# Patient Record
Sex: MALE | Race: WHITE | NOT HISPANIC OR LATINO | URBAN - METROPOLITAN AREA
[De-identification: names, ages, dates, MRNs, and addresses within clinical notes are randomized per-mention and may not be internally consistent; named-entity substitution may affect disease eponyms.]

---

## 2022-12-27 ENCOUNTER — OFFICE VISIT (OUTPATIENT)
Dept: INTERNAL MEDICINE | Facility: CLINIC | Age: 72
End: 2022-12-27
Payer: COMMERCIAL

## 2022-12-27 VITALS
TEMPERATURE: 97.1 F | HEIGHT: 70 IN | WEIGHT: 232 LBS | HEART RATE: 96 BPM | BODY MASS INDEX: 33.21 KG/M2 | OXYGEN SATURATION: 99 % | DIASTOLIC BLOOD PRESSURE: 84 MMHG | SYSTOLIC BLOOD PRESSURE: 142 MMHG

## 2022-12-27 DIAGNOSIS — E11.9 TYPE 2 DIABETES MELLITUS WITHOUT COMPLICATION, WITHOUT LONG-TERM CURRENT USE OF INSULIN (CMS/HCC): ICD-10-CM

## 2022-12-27 DIAGNOSIS — I10 PRIMARY HYPERTENSION: Primary | ICD-10-CM

## 2022-12-27 DIAGNOSIS — Z11.59 ENCOUNTER FOR HEPATITIS C SCREENING TEST FOR LOW RISK PATIENT: ICD-10-CM

## 2022-12-27 DIAGNOSIS — E78.9 LIPID DISORDER: ICD-10-CM

## 2022-12-27 PROCEDURE — 99204 OFFICE O/P NEW MOD 45 MIN: CPT | Performed by: STUDENT IN AN ORGANIZED HEALTH CARE EDUCATION/TRAINING PROGRAM

## 2022-12-27 PROCEDURE — 3008F BODY MASS INDEX DOCD: CPT | Performed by: STUDENT IN AN ORGANIZED HEALTH CARE EDUCATION/TRAINING PROGRAM

## 2022-12-27 RX ORDER — EZETIMIBE 10 MG/1
10 TABLET ORAL DAILY
COMMUNITY
Start: 2022-10-13 | End: 2023-07-05 | Stop reason: SDUPTHER

## 2022-12-27 RX ORDER — LOSARTAN POTASSIUM 50 MG/1
50 TABLET ORAL DAILY
COMMUNITY
Start: 2022-10-06 | End: 2023-02-23

## 2022-12-27 RX ORDER — GLIPIZIDE 10 MG/1
10 TABLET ORAL DAILY
COMMUNITY
Start: 2022-12-03 | End: 2023-07-05 | Stop reason: SDUPTHER

## 2022-12-27 RX ORDER — METFORMIN HYDROCHLORIDE 500 MG/1
1000 TABLET, EXTENDED RELEASE ORAL DAILY
COMMUNITY
Start: 2022-10-05 | End: 2023-04-03 | Stop reason: SDUPTHER

## 2022-12-27 ASSESSMENT — PATIENT HEALTH QUESTIONNAIRE - PHQ9: SUM OF ALL RESPONSES TO PHQ9 QUESTIONS 1 & 2: 0

## 2022-12-27 NOTE — PROGRESS NOTES
NEW PATIENT    [facility] [place]  Arlet Kumar MD    Phone: [phone]  Fax: [phone]      HISTORY OF PRESENT ILLNESS        Establish Care       Patient is an 72 y.o. male who presents on 1/5/2023 as new patient to establish care.    History of Present Illness    Recent health issues:    DM  - metformin - takes every other day because of side effects  - taking januvia, glipizide   - a little movement day to day      Other Providers caring for patient:  Patient Care Team     Relationship Specialty Notifications Start End   Arlet Kumar MD PCP - General Family Medicine Admissions 12/27/22     Address:  [address] [place] [id number]          Health Maintenance   Topic Date Due   • Diabetes Kidney Health Evaluation: eGFR  Never done   • Diabetes Kidney Health Evaluation:  uACR  Never done   • Colorectal Cancer Screening  Never done   • COVID-19 Vaccine (1) Never done   • Pneumococcal (65 years and older) (1 - PCV) Never done   • Annual Dilated Retinal Exam  Never done   • Diabetic Foot Exam  Never done   • Hemoglobin A1C  Never done   • Medicare Annual Wellness Visit  Never done   • Hepatitis C Screening  Never done   • DTaP, Tdap, and Td Vaccines (1 - Tdap) Never done   • Zoster Vaccine (1 of 2) Never done   • Falls Risk Screening  Never done   • Influenza Vaccine (1) Never done   • Depression Screening  12/27/2023   • Meningococcal ACWY  Aged Out   • HIB Vaccines  Aged Out   • Hepatitis B Vaccines  Aged Out   • IPV Vaccines  Aged Out   • HPV Vaccines  Aged Out       Below was reviewed by me on the day of the visit.  PAST MEDICAL AND SURGICAL HISTORY        Past Medical History:   Diagnosis Date   • Hypertension    • Lipid disorder    • Type 2 diabetes mellitus (CMS/HCC)        Past Surgical History:   Procedure Laterality Date   • HERNIA REPAIR       MEDICATIONS          Current Outpatient Medications:   •  ezetimibe (ZETIA) 10 mg tablet, Take  "10 mg by mouth daily., Disp: , Rfl:   •  glipiZIDE (GLUCOTROL) 10 mg tablet, Take 10 mg by mouth daily., Disp: , Rfl:   •  losartan (COZAAR) 50 mg tablet, Take 50 mg by mouth daily., Disp: , Rfl:   •  metFORMIN XR (GLUCOPHAGE-XR) 500 mg 24 hr tablet, Take 1,000 mg by mouth daily., Disp: , Rfl:   •  SITagliptin (JANUVIA) 100 mg tablet, Take 100 mg by mouth daily., Disp: , Rfl:   ALLERGIES        Patient has no known allergies.  FAMILY HISTORY        Family History   Problem Relation Age of Onset   • Heart attack Biological Father      SOCIAL/ TOBACCO HISTORY        Social History     Tobacco Use   • Smoking status: Never   • Smokeless tobacco: Never   Substance Use Topics   • Alcohol use: Never     REVIEW OF SYSTEMS        Review of Systems   See HPI  PHYSICAL EXAMINATION      Visit Vitals  BP (!) 142/84 (BP Location: Right upper arm, Patient Position: Sitting)   Pulse 96   Temp 36.2 °C (97.1 °F)   Ht 1.778 m (5' 10\")   Wt 105 kg (232 lb)   SpO2 99%   BMI 33.29 kg/m²      Body mass index is 33.29 kg/m².  Wt Readings from Last 3 Encounters:   12/27/22 105 kg (232 lb)        No results found.      Physical Exam  Vitals reviewed.   Constitutional:       General: He is not in acute distress.  HENT:      Head: Normocephalic and atraumatic.   Eyes:      Conjunctiva/sclera: Conjunctivae normal.   Cardiovascular:      Rate and Rhythm: Normal rate and regular rhythm.      Heart sounds: Normal heart sounds.   Pulmonary:      Effort: Pulmonary effort is normal.      Breath sounds: Normal breath sounds.   Musculoskeletal:      Cervical back: Neck supple.   Skin:     General: Skin is warm and dry.   Neurological:      Mental Status: He is alert.   Psychiatric:         Mood and Affect: Mood normal.         Behavior: Behavior normal.         PRIOR LABS        No results found for: HGBA1C  No results found for: CHOL  No results found for: HDL  No results found for: LDLCALC  No results found for: TRIG  No results found for: " CHOLHDL  No results found for: TSH  No results found for: WBC, HGB, HCT, MCV, PLT  No results found for: NA, K, CL, CO2, BUN, CREATININE, GLUCOSE, AST, ALT, PROTEIN, ALBUMIN, BILITOT, EGFR, ANIONGAP    ASSESSMENT AND PLAN   Diagnoses and all orders for this visit:    Primary hypertension (Primary)  Assessment & Plan:  BP slightly above goal today. Recommend efforts at lowering through diet and physical activity. If remains elevated at follow up visit, would recommend adding/adjusting medications.    Reviewed to take BP medication as prescribed  Eat a healthy diet- avoid foods high in salt   Exercise and try to maintain a healthy weight  Avoid tobacco products  Avoid over-the -counter decongestant medications unless recommended by your provider  Monitor your BP at home if possible  Keep BP log and bring to your next appointment  Please call the office or go to the ER for new symptoms such as severe headache, dizziness, confusion, weakness in an arm or leg, trouble speaking, abdominal pain, chest pain, difficulty breathing, or other new or worsening symptoms.      Orders:  -     Basic metabolic panel; Future    Type 2 diabetes mellitus without complication, without long-term current use of insulin (CMS/[facility])  Assessment & Plan:  Current medications: metformin QOD (max tolerated), glipizide, sitagliptin  Labs due  Recommend healthful diet and regular physical activity    Orders:  -     Hemoglobin A1c; Future  -     Basic metabolic panel; Future  -     Microalbumin / creatinine, urine ratio; Future    Lipid disorder  -     Lipid panel; Future    Encounter for hepatitis C screening test for low risk patient  -     Hepatitis C antibody; Future    Return in about 1 month (around 1/27/2023).  To request prior PCP records    Arlet Kumar MD    1/5/2023

## 2023-01-05 NOTE — ASSESSMENT & PLAN NOTE
Current medications: metformin QOD (max tolerated), glipizide, sitagliptin  Labs due  Recommend healthful diet and regular physical activity

## 2023-01-05 NOTE — ASSESSMENT & PLAN NOTE
BP slightly above goal today. Recommend efforts at lowering through diet and physical activity. If remains elevated at follow up visit, would recommend adding/adjusting medications.    Reviewed to take BP medication as prescribed  Eat a healthy diet- avoid foods high in salt   Exercise and try to maintain a healthy weight  Avoid tobacco products  Avoid over-the -counter decongestant medications unless recommended by your provider  Monitor your BP at home if possible  Keep BP log and bring to your next appointment  Please call the office or go to the ER for new symptoms such as severe headache, dizziness, confusion, weakness in an arm or leg, trouble speaking, abdominal pain, chest pain, difficulty breathing, or other new or worsening symptoms.

## 2023-01-13 ENCOUNTER — APPOINTMENT (OUTPATIENT)
Dept: LAB | Age: 73
End: 2023-01-13
Attending: STUDENT IN AN ORGANIZED HEALTH CARE EDUCATION/TRAINING PROGRAM
Payer: COMMERCIAL

## 2023-01-13 DIAGNOSIS — Z11.59 ENCOUNTER FOR HEPATITIS C SCREENING TEST FOR LOW RISK PATIENT: ICD-10-CM

## 2023-01-13 DIAGNOSIS — E11.9 TYPE 2 DIABETES MELLITUS WITHOUT COMPLICATION, WITHOUT LONG-TERM CURRENT USE OF INSULIN (CMS/HCC): ICD-10-CM

## 2023-01-13 DIAGNOSIS — I10 PRIMARY HYPERTENSION: ICD-10-CM

## 2023-01-13 DIAGNOSIS — E78.9 LIPID DISORDER: ICD-10-CM

## 2023-01-13 LAB
ALBUMIN/CREAT UR: 8.1 UG/MG
ANION GAP SERPL CALC-SCNC: 9 MEQ/L (ref 3–15)
BUN SERPL-MCNC: 13 MG/DL (ref 8–20)
CALCIUM SERPL-MCNC: 10.1 MG/DL (ref 8.9–10.3)
CHLORIDE SERPL-SCNC: 102 MEQ/L (ref 98–109)
CHOLEST SERPL-MCNC: 169 MG/DL
CO2 SERPL-SCNC: 27 MEQ/L (ref 22–32)
CREAT SERPL-MCNC: 1.2 MG/DL (ref 0.8–1.3)
CREAT UR-MCNC: 184.1 MG/DL
EST. AVERAGE GLUCOSE BLD GHB EST-MCNC: 186 MG/DL
GFR SERPL CREATININE-BSD FRML MDRD: 59.9 ML/MIN/1.73M*2
GLUCOSE SERPL-MCNC: 139 MG/DL (ref 70–99)
HBA1C MFR BLD HPLC: 8.1 %
HDLC SERPL-MCNC: 35 MG/DL
HDLC SERPL: 4.8 {RATIO}
LDLC SERPL CALC-MCNC: 119 MG/DL
MICROALBUMIN UR-MCNC: 15 MG/L
NONHDLC SERPL-MCNC: 134 MG/DL
POTASSIUM SERPL-SCNC: 4.8 MEQ/L (ref 3.6–5.1)
SODIUM SERPL-SCNC: 138 MEQ/L (ref 136–144)
TRIGL SERPL-MCNC: 76 MG/DL (ref 30–149)

## 2023-01-13 PROCEDURE — 80061 LIPID PANEL: CPT

## 2023-01-13 PROCEDURE — 82570 ASSAY OF URINE CREATININE: CPT

## 2023-01-13 PROCEDURE — 86803 HEPATITIS C AB TEST: CPT

## 2023-01-13 PROCEDURE — 80048 BASIC METABOLIC PNL TOTAL CA: CPT

## 2023-01-13 PROCEDURE — 36415 COLL VENOUS BLD VENIPUNCTURE: CPT

## 2023-01-13 PROCEDURE — 83036 HEMOGLOBIN GLYCOSYLATED A1C: CPT

## 2023-01-13 PROCEDURE — 82043 UR ALBUMIN QUANTITATIVE: CPT

## 2023-01-14 LAB — HCV AB SER QL: NONREACTIVE

## 2023-01-31 ENCOUNTER — OFFICE VISIT (OUTPATIENT)
Dept: INTERNAL MEDICINE | Facility: CLINIC | Age: 73
End: 2023-01-31
Payer: COMMERCIAL

## 2023-01-31 VITALS
OXYGEN SATURATION: 97 % | HEART RATE: 86 BPM | HEIGHT: 69 IN | BODY MASS INDEX: 34.21 KG/M2 | DIASTOLIC BLOOD PRESSURE: 86 MMHG | TEMPERATURE: 97.5 F | SYSTOLIC BLOOD PRESSURE: 130 MMHG | WEIGHT: 231 LBS

## 2023-01-31 DIAGNOSIS — I10 PRIMARY HYPERTENSION: Primary | ICD-10-CM

## 2023-01-31 DIAGNOSIS — E78.9 LIPID DISORDER: ICD-10-CM

## 2023-01-31 DIAGNOSIS — E11.9 TYPE 2 DIABETES MELLITUS WITHOUT COMPLICATION, WITHOUT LONG-TERM CURRENT USE OF INSULIN (CMS/HCC): ICD-10-CM

## 2023-01-31 PROCEDURE — 99214 OFFICE O/P EST MOD 30 MIN: CPT | Performed by: STUDENT IN AN ORGANIZED HEALTH CARE EDUCATION/TRAINING PROGRAM

## 2023-01-31 PROCEDURE — 3008F BODY MASS INDEX DOCD: CPT | Performed by: STUDENT IN AN ORGANIZED HEALTH CARE EDUCATION/TRAINING PROGRAM

## 2023-01-31 RX ORDER — ATORVASTATIN CALCIUM 20 MG/1
20 TABLET, FILM COATED ORAL DAILY
Qty: 90 TABLET | Refills: 0 | Status: SHIPPED | OUTPATIENT
Start: 2023-01-31 | End: 2023-05-01

## 2023-01-31 NOTE — ASSESSMENT & PLAN NOTE
A1c 8.1  Not at goal, reviewed with patient and discussed addition of SGLT2i. He is averse to adding a new medication that will not be well covered by insurance.  Recommend working on diet and increasing movement/physical activity  Continue ARB  Start statin  Repeat A1c in 3 months

## 2023-01-31 NOTE — ASSESSMENT & PLAN NOTE
BP in acceptable range. Continue current medications, healthful low sodium diet, and regular physical activity.

## 2023-01-31 NOTE — PROGRESS NOTES
[facility] in [place]  Arlet Kumar MD      Phone: [phone]  Fax: [phone]           Patient ID: Randell Samuel                              : 1950    Visit Date: 2023    Chief Complaint: Follow-up      HPI      Patient ID: Randell Samuel is a 72 y.o. male who presents for follow up    T2DM  - A1c 8.1  - metformin 500 mg daily (couldn't tolerate side effects with higher dose), glipizide 10 mg daily, sitagliptin 100 mg daily  - last retinal eye exam was 2022    HTN - stable on current medication    CRC- last c-scope was 7-8 years ago and plan had been to repeat in 10 years, doesn't know the name of doc who did c-scope    The following have been reviewed and updated as appropriate in this visit:    Current Outpatient Medications:   •  atorvastatin (LIPITOR) 20 mg tablet, Take 1 tablet (20 mg total) by mouth daily., Disp: 90 tablet, Rfl: 0  •  ezetimibe (ZETIA) 10 mg tablet, Take 10 mg by mouth daily., Disp: , Rfl:   •  glipiZIDE (GLUCOTROL) 10 mg tablet, Take 10 mg by mouth daily., Disp: , Rfl:   •  losartan (COZAAR) 50 mg tablet, Take 50 mg by mouth daily., Disp: , Rfl:   •  metFORMIN XR (GLUCOPHAGE-XR) 500 mg 24 hr tablet, Take 1,000 mg by mouth daily., Disp: , Rfl:   •  SITagliptin (JANUVIA) 100 mg tablet, Take 100 mg by mouth daily., Disp: , Rfl:     No Known Allergies      Health Maintenance   Topic Date Due   • Colorectal Cancer Screening  Never done   • COVID-19 Vaccine (1) Never done   • Annual Dilated Retinal Exam  Never done   • Diabetic Foot Exam  Never done   • Medicare Annual Wellness Visit  Never done   • Zoster Vaccine (1 of 2) Never done   • Falls Risk Screening  Never done   • Pneumococcal (65 years and older) (3 - PPSV23 if available, else PCV20) 2018   • Influenza Vaccine (1) Never done   • Depression Screening  2023   • Diabetes Kidney Health Evaluation: eGFR  2024   • Diabetes Kidney Health Evaluation:  uACR   "01/13/2024   • Hemoglobin A1C  01/13/2024   • DTaP, Tdap, and Td Vaccines (2 - Td or Tdap) 07/21/2032   • Hepatitis C Screening  Completed   • Meningococcal ACWY  Aged Out   • HIB Vaccines  Aged Out   • Hepatitis B Vaccines  Aged Out   • IPV Vaccines  Aged Out   • HPV Vaccines  Aged Out       Other screenings not listed above:    Review of Systems  Rest of ROS as above    Objective:    Vitals:    01/31/23 1045   BP: 130/86   BP Location: Right upper arm   Patient Position: Sitting   Pulse: 86   Temp: 36.4 °C (97.5 °F)   SpO2: 97%   Weight: 105 kg (231 lb)   Height: 1.753 m (5' 9\")       Body mass index is 34.11 kg/m².  Wt Readings from Last 3 Encounters:   01/31/23 105 kg (231 lb)   12/27/22 105 kg (232 lb)          Physical Exam  Vitals reviewed.   Constitutional:       General: He is not in acute distress.     Appearance: He is not ill-appearing.   HENT:      Head: Normocephalic and atraumatic.   Eyes:      Conjunctiva/sclera: Conjunctivae normal.   Cardiovascular:      Rate and Rhythm: Normal rate.   Pulmonary:      Effort: Pulmonary effort is normal.   Musculoskeletal:         General: No deformity.   Neurological:      General: No focal deficit present.      Mental Status: He is alert.   Psychiatric:         Mood and Affect: Mood normal.         Behavior: Behavior normal.           Assessment and plan    Diagnoses and all orders for this visit:    Primary hypertension (Primary)  Assessment & Plan:  BP in acceptable range. Continue current medications, healthful low sodium diet, and regular physical activity.      Type 2 diabetes mellitus without complication, without long-term current use of insulin (CMS/[facility])  Assessment & Plan:  A1c 8.1  Not at goal, reviewed with patient and discussed addition of SGLT2i. He is averse to adding a new medication that will not be well covered by insurance.  Recommend working on diet and increasing movement/physical activity  Continue ARB  Start statin  Repeat A1c in 3 " months    Orders:  -     Hemoglobin A1c; Future    Lipid disorder  Assessment & Plan:  Lab Results   Component Value Date    CHOL 169 01/13/2023     Lab Results   Component Value Date    HDL 35 (L) 01/13/2023     Lab Results   Component Value Date    LDLCALC 119 (H) 01/13/2023     Lab Results   Component Value Date    TRIG 76 01/13/2023     Statin indicated, particularly in light of T2DM  Start atorvastatin  Recheck lipids with next labs    Orders:  -     atorvastatin (LIPITOR) 20 mg tablet; Take 1 tablet (20 mg total) by mouth daily.  -     Lipid panel; Future      Return in about 4 months (around 5/31/2023).    Arlet Kumar MD   1/31/2023

## 2023-01-31 NOTE — PATIENT INSTRUCTIONS
Go to the lab to have your bloodwork done in 3 months    Work on diet and physical activity to improve your blood sugar and cholesterol    Things that you can do to improve cholesterol and lower risk of cardiovascular disease:  - Eat a high fiber, low-salt diet that emphasizes fruits, vegetables, whole grains, beans, nuts and seeds  - Limit the amount of animal fats and other saturated fats in your diet (fried foods, fast food, take-out, ultra-processed snacks and sweets, red meat, full fat dairy)  - Use good fats in moderation (fatty fish, olive oil, avocado, nuts and seeds)  - Exercise on most days of the week for at least 30 minutes  - Avoid tobacco use and drink alcohol in moderation, if at all  - Develop healthy coping skills to manage stress

## 2023-01-31 NOTE — ASSESSMENT & PLAN NOTE
Lab Results   Component Value Date    CHOL 169 01/13/2023     Lab Results   Component Value Date    HDL 35 (L) 01/13/2023     Lab Results   Component Value Date    LDLCALC 119 (H) 01/13/2023     Lab Results   Component Value Date    TRIG 76 01/13/2023     Statin indicated, particularly in light of T2DM  Start atorvastatin  Recheck lipids with next labs

## 2023-02-17 ENCOUNTER — TELEPHONE (OUTPATIENT)
Dept: INTERNAL MEDICINE | Facility: CLINIC | Age: 73
End: 2023-02-17
Payer: COMMERCIAL

## 2023-02-22 NOTE — TELEPHONE ENCOUNTER
Medicine Refill Request    Last Office: 1/31/2023   Last Consult Visit: Visit date not found  Last Telemedicine Visit: Visit date not found    Next Appointment: 6/1/2023      Current Outpatient Medications:   •  atorvastatin (LIPITOR) 20 mg tablet, Take 1 tablet (20 mg total) by mouth daily., Disp: 90 tablet, Rfl: 0  •  ezetimibe (ZETIA) 10 mg tablet, Take 10 mg by mouth daily., Disp: , Rfl:   •  glipiZIDE (GLUCOTROL) 10 mg tablet, Take 10 mg by mouth daily., Disp: , Rfl:   •  losartan (COZAAR) 50 mg tablet, Take 50 mg by mouth daily., Disp: , Rfl:   •  metFORMIN XR (GLUCOPHAGE-XR) 500 mg 24 hr tablet, Take 1,000 mg by mouth daily., Disp: , Rfl:   •  SITagliptin (JANUVIA) 100 mg tablet, Take 100 mg by mouth daily., Disp: , Rfl:       BP Readings from Last 3 Encounters:   01/31/23 130/86   12/27/22 (!) 142/84       Recent Lab results:  Lab Results   Component Value Date    CHOL 169 01/13/2023   ,   Lab Results   Component Value Date    HDL 35 (L) 01/13/2023   ,   Lab Results   Component Value Date    LDLCALC 119 (H) 01/13/2023   ,   Lab Results   Component Value Date    TRIG 76 01/13/2023        Lab Results   Component Value Date    GLUCOSE 139 (H) 01/13/2023   ,   Lab Results   Component Value Date    HGBA1C 8.1 (H) 01/13/2023         Lab Results   Component Value Date    CREATININE 1.2 01/13/2023       No results found for: TSH

## 2023-02-23 RX ORDER — LOSARTAN POTASSIUM 50 MG/1
TABLET ORAL
Qty: 90 TABLET | Refills: 2 | Status: SHIPPED | OUTPATIENT
Start: 2023-02-23 | End: 2023-06-01

## 2023-02-23 NOTE — TELEPHONE ENCOUNTER
Left message on voicemail that form was completed and mailed and losartin refill 90 day was sent to pharm

## 2023-02-23 NOTE — TELEPHONE ENCOUNTER
Pt called to inquire if form had been completed and mailed back so he can get his medication.    Please advise

## 2023-03-09 ENCOUNTER — APPOINTMENT (OUTPATIENT)
Dept: LAB | Age: 73
End: 2023-03-09
Attending: STUDENT IN AN ORGANIZED HEALTH CARE EDUCATION/TRAINING PROGRAM
Payer: COMMERCIAL

## 2023-03-09 DIAGNOSIS — E78.9 LIPID DISORDER: ICD-10-CM

## 2023-03-09 DIAGNOSIS — E11.9 TYPE 2 DIABETES MELLITUS WITHOUT COMPLICATION, WITHOUT LONG-TERM CURRENT USE OF INSULIN (CMS/HCC): ICD-10-CM

## 2023-03-09 LAB
CHOLEST SERPL-MCNC: 94 MG/DL
EST. AVERAGE GLUCOSE BLD GHB EST-MCNC: 180 MG/DL
HBA1C MFR BLD HPLC: 7.9 %
HDLC SERPL-MCNC: 34 MG/DL
HDLC SERPL: 2.8 {RATIO}
LDLC SERPL CALC-MCNC: 47 MG/DL
NONHDLC SERPL-MCNC: 60 MG/DL
TRIGL SERPL-MCNC: 67 MG/DL (ref 30–149)

## 2023-03-09 PROCEDURE — 80061 LIPID PANEL: CPT

## 2023-03-09 PROCEDURE — 83036 HEMOGLOBIN GLYCOSYLATED A1C: CPT

## 2023-03-09 PROCEDURE — 36415 COLL VENOUS BLD VENIPUNCTURE: CPT

## 2023-04-03 RX ORDER — METFORMIN HYDROCHLORIDE 500 MG/1
1000 TABLET, EXTENDED RELEASE ORAL DAILY
Qty: 180 TABLET | Refills: 1 | Status: SHIPPED | OUTPATIENT
Start: 2023-04-03

## 2023-04-03 NOTE — TELEPHONE ENCOUNTER
Medicine Refill Request    Last Office: 1/31/2023   Last Consult Visit: Visit date not found  Last Telemedicine Visit: Visit date not found    Next Appointment: 6/1/2023      Current Outpatient Medications:   •  atorvastatin (LIPITOR) 20 mg tablet, Take 1 tablet (20 mg total) by mouth daily., Disp: 90 tablet, Rfl: 0  •  ezetimibe (ZETIA) 10 mg tablet, Take 10 mg by mouth daily., Disp: , Rfl:   •  glipiZIDE (GLUCOTROL) 10 mg tablet, Take 10 mg by mouth daily., Disp: , Rfl:   •  losartan (COZAAR) 50 mg tablet, TAKE 1 TABLET BY MOUTH EVERY DAY, Disp: 90 tablet, Rfl: 2  •  metFORMIN XR (GLUCOPHAGE-XR) 500 mg 24 hr tablet, Take 1,000 mg by mouth daily., Disp: , Rfl:   •  SITagliptin (JANUVIA) 100 mg tablet, Take 100 mg by mouth daily., Disp: , Rfl:       BP Readings from Last 3 Encounters:   01/31/23 130/86   12/27/22 (!) 142/84       Recent Lab results:  Lab Results   Component Value Date    CHOL 94 03/09/2023   ,   Lab Results   Component Value Date    HDL 34 (L) 03/09/2023   ,   Lab Results   Component Value Date    LDLCALC 47 03/09/2023   ,   Lab Results   Component Value Date    TRIG 67 03/09/2023        Lab Results   Component Value Date    GLUCOSE 139 (H) 01/13/2023   ,   Lab Results   Component Value Date    HGBA1C 7.9 (H) 03/09/2023         Lab Results   Component Value Date    CREATININE 1.2 01/13/2023       No results found for: TSH

## 2023-04-29 DIAGNOSIS — E78.9 LIPID DISORDER: ICD-10-CM

## 2023-05-01 RX ORDER — ATORVASTATIN CALCIUM 20 MG/1
TABLET, FILM COATED ORAL
Qty: 90 TABLET | Refills: 0 | Status: SHIPPED | OUTPATIENT
Start: 2023-05-01 | End: 2023-07-05 | Stop reason: SDUPTHER

## 2023-05-01 NOTE — TELEPHONE ENCOUNTER
Medicine Refill Request    Last Office: 1/31/2023   Last Consult Visit: Visit date not found  Last Telemedicine Visit: Visit date not found    Next Appointment: 6/1/2023      Current Outpatient Medications:   •  atorvastatin (LIPITOR) 20 mg tablet, Take 1 tablet (20 mg total) by mouth daily., Disp: 90 tablet, Rfl: 0  •  ezetimibe (ZETIA) 10 mg tablet, Take 10 mg by mouth daily., Disp: , Rfl:   •  glipiZIDE (GLUCOTROL) 10 mg tablet, Take 10 mg by mouth daily., Disp: , Rfl:   •  losartan (COZAAR) 50 mg tablet, TAKE 1 TABLET BY MOUTH EVERY DAY, Disp: 90 tablet, Rfl: 2  •  metFORMIN XR (GLUCOPHAGE-XR) 500 mg 24 hr tablet, Take 2 tablets (1,000 mg total) by mouth daily., Disp: 180 tablet, Rfl: 1  •  SITagliptin (JANUVIA) 100 mg tablet, Take 100 mg by mouth daily., Disp: , Rfl:       BP Readings from Last 3 Encounters:   01/31/23 130/86   12/27/22 (!) 142/84       Recent Lab results:  Lab Results   Component Value Date    CHOL 94 03/09/2023   ,   Lab Results   Component Value Date    HDL 34 (L) 03/09/2023   ,   Lab Results   Component Value Date    LDLCALC 47 03/09/2023   ,   Lab Results   Component Value Date    TRIG 67 03/09/2023        Lab Results   Component Value Date    GLUCOSE 139 (H) 01/13/2023   ,   Lab Results   Component Value Date    HGBA1C 7.9 (H) 03/09/2023         Lab Results   Component Value Date    CREATININE 1.2 01/13/2023       No results found for: TSH

## 2023-06-01 ENCOUNTER — OFFICE VISIT (OUTPATIENT)
Dept: INTERNAL MEDICINE | Facility: CLINIC | Age: 73
End: 2023-06-01
Payer: COMMERCIAL

## 2023-06-01 VITALS
TEMPERATURE: 97.9 F | RESPIRATION RATE: 20 BRPM | SYSTOLIC BLOOD PRESSURE: 160 MMHG | DIASTOLIC BLOOD PRESSURE: 80 MMHG | HEIGHT: 69 IN | BODY MASS INDEX: 34.21 KG/M2 | OXYGEN SATURATION: 95 % | HEART RATE: 90 BPM | WEIGHT: 231 LBS

## 2023-06-01 DIAGNOSIS — J45.20 MILD INTERMITTENT ASTHMA WITHOUT COMPLICATION: ICD-10-CM

## 2023-06-01 DIAGNOSIS — I10 PRIMARY HYPERTENSION: ICD-10-CM

## 2023-06-01 DIAGNOSIS — E78.9 LIPID DISORDER: ICD-10-CM

## 2023-06-01 DIAGNOSIS — E11.9 TYPE 2 DIABETES MELLITUS WITHOUT COMPLICATION, WITHOUT LONG-TERM CURRENT USE OF INSULIN (CMS/HCC): Primary | ICD-10-CM

## 2023-06-01 PROCEDURE — 99214 OFFICE O/P EST MOD 30 MIN: CPT | Performed by: STUDENT IN AN ORGANIZED HEALTH CARE EDUCATION/TRAINING PROGRAM

## 2023-06-01 PROCEDURE — 3079F DIAST BP 80-89 MM HG: CPT | Performed by: STUDENT IN AN ORGANIZED HEALTH CARE EDUCATION/TRAINING PROGRAM

## 2023-06-01 PROCEDURE — 3077F SYST BP >= 140 MM HG: CPT | Performed by: STUDENT IN AN ORGANIZED HEALTH CARE EDUCATION/TRAINING PROGRAM

## 2023-06-01 PROCEDURE — 3008F BODY MASS INDEX DOCD: CPT | Performed by: STUDENT IN AN ORGANIZED HEALTH CARE EDUCATION/TRAINING PROGRAM

## 2023-06-01 RX ORDER — LOSARTAN POTASSIUM 100 MG/1
100 TABLET ORAL DAILY
Qty: 90 TABLET | Refills: 1 | Status: SHIPPED | OUTPATIENT
Start: 2023-06-01 | End: 2023-11-28

## 2023-06-01 RX ORDER — ALBUTEROL SULFATE 90 UG/1
2 INHALANT RESPIRATORY (INHALATION) EVERY 6 HOURS PRN
Qty: 18 G | Refills: 1 | Status: SHIPPED | OUTPATIENT
Start: 2023-06-01 | End: 2023-07-01

## 2023-06-01 NOTE — PROGRESS NOTES
[facility] [place]  Arlet Kumar MD      Phone: [phone]  Fax: [phone]           Patient ID: Randell Samuel                              : 1950    Visit Date: 2023    Chief Complaint: Follow-up      HPI      Patient ID: Randell Samuel is a 72 y.o. male who presents for follow up    No acute concerns today    T2DM  Metformin 500 mg XR daily - has done reading that gives him concern about taking any more than the current dose of metformin that he is taking  Tatking glipizide 10 mg, sitagliptin 100 mg    HTN  Not checking BP at home  No CP, SOB, vision changes, edema    Coughing and sneezing  Taking OTC allergy pill  Has had albuterol in past with good effect    The following have been reviewed and updated as appropriate in this visit:    Current Outpatient Medications:   •  albuterol HFA 90 mcg/actuation inhaler, Inhale 2 puffs every 6 (six) hours as needed for wheezing., Disp: 18 g, Rfl: 1  •  atorvastatin (LIPITOR) 20 mg tablet, TAKE 1 TABLET(20 MG) BY MOUTH DAILY, Disp: 90 tablet, Rfl: 0  •  ezetimibe (ZETIA) 10 mg tablet, Take 10 mg by mouth daily., Disp: , Rfl:   •  glipiZIDE (GLUCOTROL) 10 mg tablet, Take 10 mg by mouth daily., Disp: , Rfl:   •  losartan (COZAAR) 100 mg tablet, Take 1 tablet (100 mg total) by mouth daily., Disp: 90 tablet, Rfl: 1  •  metFORMIN XR (GLUCOPHAGE-XR) 500 mg 24 hr tablet, Take 2 tablets (1,000 mg total) by mouth daily. (Patient taking differently: Take 500 mg by mouth daily.), Disp: 180 tablet, Rfl: 1  •  SITagliptin (JANUVIA) 100 mg tablet, Take 100 mg by mouth daily., Disp: , Rfl:     No Known Allergies      Health Maintenance   Topic Date Due   • Colorectal Cancer Screening  Never done   • COVID-19 Vaccine (1) Never done   • Annual Dilated Retinal Exam  Never done   • Diabetic Foot Exam  Never done   • Medicare Annual Wellness Visit  Never done   • Zoster Vaccine (1 of 2) Never done   • Falls Risk Screening  Never  "done   • Pneumococcal (65 years and older) (3 - PPSV23 if available, else PCV20) 09/20/2018   • Influenza Vaccine (Season Ended) 08/01/2023   • Depression Screening  12/27/2023   • Diabetes Kidney Health Evaluation: eGFR  01/13/2024   • Diabetes Kidney Health Evaluation:  uACR  01/13/2024   • Hemoglobin A1C  03/09/2024   • DTaP, Tdap, and Td Vaccines (2 - Td or Tdap) 07/21/2032   • Hepatitis C Screening  Completed   • Meningococcal ACWY  Aged Out   • HIB Vaccines  Aged Out   • Hepatitis B Vaccines  Aged Out   • IPV Vaccines  Aged Out   • HPV Vaccines  Aged Out       Other screenings not listed above:    Review of Systems  Rest of ROS as above    Objective:    Vitals:    06/01/23 1252 06/01/23 1347   BP: (!) 170/86 (!) 160/80   BP Location: Right upper arm    Patient Position: Sitting    Pulse: (!) 108 90   Resp: 20    Temp: 36.6 °C (97.9 °F)    TempSrc: Oral    SpO2: 95%    Weight: 105 kg (231 lb)    Height: 1.753 m (5' 9\")        Body mass index is 34.11 kg/m².  Wt Readings from Last 3 Encounters:   06/01/23 105 kg (231 lb)   01/31/23 105 kg (231 lb)   12/27/22 105 kg (232 lb)          Physical Exam  Vitals reviewed.   Constitutional:       General: He is not in acute distress.     Appearance: He is not ill-appearing.   HENT:      Head: Normocephalic and atraumatic.   Eyes:      Conjunctiva/sclera: Conjunctivae normal.   Cardiovascular:      Rate and Rhythm: Normal rate and regular rhythm.   Pulmonary:      Effort: Pulmonary effort is normal.      Breath sounds: Normal breath sounds.   Musculoskeletal:      Right lower leg: No edema.      Left lower leg: No edema.   Skin:     General: Skin is warm and dry.   Neurological:      General: No focal deficit present.      Mental Status: He is alert.   Psychiatric:         Mood and Affect: Mood normal.         Behavior: Behavior normal.           Assessment and plan    Diagnoses and all orders for this visit:    Type 2 diabetes mellitus without complication, without " long-term current use of insulin (CMS/[facility]) (Primary)  Assessment & Plan:  Lab Results   Component Value Date    HGBA1C 7.9 (H) 03/09/2023     Taking metformin  mg daily (does not want to take more than this due to concerns about safety)  Glipizide 10 mg daily  Sitagliptin 100 mg daily  Not at goal, reviewed with patient and discussed addition of SGLT2i. He is averse to adding a new medication that will not be well covered by insurance. To recheck A1c and will revisit if level remains above 7.5.  Recommend working on diet and increasing movement/physical activity    Orders:  -     Hemoglobin A1c; Future  -     Basic metabolic panel; Future  -     Basic metabolic panel; Future  -     Hemoglobin A1c; Future    Primary hypertension  Assessment & Plan:  BP not at goal  Increase losartan to 100 mg daily  Monitor home BP readings - if systolic remains consistently >140, follow up sooner  Work on diet - low sodium/salt, avoid ultra-processed foods    Orders:  -     losartan (COZAAR) 100 mg tablet; Take 1 tablet (100 mg total) by mouth daily.  -     Basic metabolic panel; Future  -     Basic metabolic panel; Future    Lipid disorder  Assessment & Plan:  Lab Results   Component Value Date    CHOL 94 03/09/2023     Lab Results   Component Value Date    HDL 34 (L) 03/09/2023     Lab Results   Component Value Date    LDLCALC 47 03/09/2023     Lab Results   Component Value Date    TRIG 67 03/09/2023     LDL at goal  Continue statin      Mild intermittent asthma without complication  -     albuterol HFA 90 mcg/actuation inhaler; Inhale 2 puffs every 6 (six) hours as needed for wheezing.      Return in about 4 months (around 10/1/2023).    Arlet Kumar MD   6/1/2023

## 2023-06-01 NOTE — ASSESSMENT & PLAN NOTE
Lab Results   Component Value Date    HGBA1C 7.9 (H) 03/09/2023     Taking metformin  mg daily (does not want to take more than this due to concerns about safety)  Glipizide 10 mg daily  Sitagliptin 100 mg daily  Not at goal, reviewed with patient and discussed addition of SGLT2i. He is averse to adding a new medication that will not be well covered by insurance. To recheck A1c and will revisit if level remains above 7.5.  Recommend working on diet and increasing movement/physical activity

## 2023-06-02 NOTE — ASSESSMENT & PLAN NOTE
Lab Results   Component Value Date    CHOL 94 03/09/2023     Lab Results   Component Value Date    HDL 34 (L) 03/09/2023     Lab Results   Component Value Date    LDLCALC 47 03/09/2023     Lab Results   Component Value Date    TRIG 67 03/09/2023     LDL at goal  Continue statin

## 2023-06-02 NOTE — ASSESSMENT & PLAN NOTE
BP not at goal  Increase losartan to 100 mg daily  Monitor home BP readings - if systolic remains consistently >140, follow up sooner  Work on diet - low sodium/salt, avoid ultra-processed foods

## 2023-06-06 LAB
BUN SERPL-MCNC: 17 MG/DL (ref 7–25)
BUN/CREAT SERPL: ABNORMAL (CALC) (ref 6–22)
CALCIUM SERPL-MCNC: 9.6 MG/DL (ref 8.6–10.3)
CHLORIDE SERPL-SCNC: 102 MMOL/L (ref 98–110)
CO2 SERPL-SCNC: 26 MMOL/L (ref 20–32)
CREAT SERPL-MCNC: 1.15 MG/DL (ref 0.7–1.28)
EGFRCR SERPLBLD CKD-EPI 2021: 68 ML/MIN/1.73M2
GLUCOSE SERPL-MCNC: 150 MG/DL (ref 65–99)
HBA1C MFR BLD: 7.8 % OF TOTAL HGB
POTASSIUM SERPL-SCNC: 4.5 MMOL/L (ref 3.5–5.3)
SODIUM SERPL-SCNC: 137 MMOL/L (ref 135–146)

## 2023-07-05 DIAGNOSIS — E78.9 LIPID DISORDER: ICD-10-CM

## 2023-07-05 RX ORDER — ATORVASTATIN CALCIUM 20 MG/1
20 TABLET, FILM COATED ORAL DAILY
Qty: 90 TABLET | Refills: 1 | Status: SHIPPED | OUTPATIENT
Start: 2023-07-05 | End: 2023-11-30

## 2023-07-05 RX ORDER — EZETIMIBE 10 MG/1
10 TABLET ORAL DAILY
Qty: 90 TABLET | Refills: 1 | Status: SHIPPED | OUTPATIENT
Start: 2023-07-05 | End: 2024-01-02

## 2023-07-05 RX ORDER — GLIPIZIDE 10 MG/1
10 TABLET ORAL DAILY
Qty: 90 TABLET | Refills: 1 | Status: SHIPPED | OUTPATIENT
Start: 2023-07-05

## 2023-07-05 NOTE — TELEPHONE ENCOUNTER
Last Office Visit: 6/1/2023  Last Telemedicine Visit: Visit date not found    Next Office Visit: 10/9/2023  Next Telemedicine Visit: Visit date not found     BP Readings from Last 3 Encounters:   06/01/23 (!) 160/80   01/31/23 130/86   12/27/22 (!) 142/84       Recent Lab results:  Lab Results   Component Value Date    CHOL 94 03/09/2023   ,   Lab Results   Component Value Date    HDL 34 (L) 03/09/2023   ,   Lab Results   Component Value Date    LDLCALC 47 03/09/2023   ,   Lab Results   Component Value Date    TRIG 67 03/09/2023        Lab Results   Component Value Date    GLUCOSE 150 (H) 06/05/2023   ,   Lab Results   Component Value Date    HGBA1C 7.8 (H) 06/05/2023         Lab Results   Component Value Date    CREATININE 1.15 06/05/2023       No results found for: TSH

## 2023-07-24 ENCOUNTER — TELEPHONE (OUTPATIENT)
Dept: INTERNAL MEDICINE | Facility: CLINIC | Age: 73
End: 2023-07-24

## 2023-07-24 NOTE — TELEPHONE ENCOUNTER
Request for Medical Advice (NON-URGENT)   Patient PCP: Arlet Kumar MD  New or Existing Issue: New   Question or Concern: Patient is calling because he has been coughing for some time. He also has some congestion. Patient will like for office to give him a call back.   Preferred Pharmacy:   The practice will reach out to discuss your Medical Question or Concern within 2 business days.

## 2023-07-25 ENCOUNTER — OFFICE VISIT (OUTPATIENT)
Dept: INTERNAL MEDICINE | Facility: CLINIC | Age: 73
End: 2023-07-25
Payer: COMMERCIAL

## 2023-07-25 VITALS
BODY MASS INDEX: 32.78 KG/M2 | TEMPERATURE: 97.1 F | WEIGHT: 229 LBS | OXYGEN SATURATION: 95 % | HEIGHT: 70 IN | SYSTOLIC BLOOD PRESSURE: 142 MMHG | DIASTOLIC BLOOD PRESSURE: 80 MMHG | HEART RATE: 103 BPM

## 2023-07-25 DIAGNOSIS — J45.21 MILD INTERMITTENT ASTHMA WITH ACUTE EXACERBATION: Primary | ICD-10-CM

## 2023-07-25 DIAGNOSIS — E11.9 TYPE 2 DIABETES MELLITUS WITHOUT COMPLICATION, WITHOUT LONG-TERM CURRENT USE OF INSULIN (CMS/HCC): ICD-10-CM

## 2023-07-25 DIAGNOSIS — R06.2 WHEEZING: ICD-10-CM

## 2023-07-25 DIAGNOSIS — I10 PRIMARY HYPERTENSION: ICD-10-CM

## 2023-07-25 PROCEDURE — 3079F DIAST BP 80-89 MM HG: CPT | Performed by: STUDENT IN AN ORGANIZED HEALTH CARE EDUCATION/TRAINING PROGRAM

## 2023-07-25 PROCEDURE — 3008F BODY MASS INDEX DOCD: CPT | Performed by: STUDENT IN AN ORGANIZED HEALTH CARE EDUCATION/TRAINING PROGRAM

## 2023-07-25 PROCEDURE — 99214 OFFICE O/P EST MOD 30 MIN: CPT | Performed by: STUDENT IN AN ORGANIZED HEALTH CARE EDUCATION/TRAINING PROGRAM

## 2023-07-25 PROCEDURE — 3077F SYST BP >= 140 MM HG: CPT | Performed by: STUDENT IN AN ORGANIZED HEALTH CARE EDUCATION/TRAINING PROGRAM

## 2023-07-25 RX ORDER — PREDNISONE 20 MG/1
40 TABLET ORAL DAILY
Qty: 10 TABLET | Refills: 0 | Status: SHIPPED | OUTPATIENT
Start: 2023-07-25 | End: 2023-07-30

## 2023-07-25 RX ORDER — BENZONATATE 200 MG/1
200 CAPSULE ORAL 3 TIMES DAILY PRN
Qty: 30 CAPSULE | Refills: 1 | Status: SHIPPED | OUTPATIENT
Start: 2023-07-25 | End: 2023-08-04

## 2023-07-25 NOTE — PROGRESS NOTES
[facility] in [place]  Arlet Kumar MD      Phone: [phone]  Fax: [phone]           Patient ID: Randell Samuel                              : 1950    Visit Date: 2023    Chief Complaint: Other - See comments (Cough & runny nose, no fever, wheezing, x 1 week )      HPI      Patient ID: Randell Samuel is a 73 y.o. male who presents for cough, wheeze    Coughing - seems more dry than anything else, a little mucus  Wheezing over past week  Denies shortness of breath  Runny nose  No fever/chills  Had trouble sleeping last night    The following have been reviewed and updated as appropriate in this visit:    Current Outpatient Medications:   •  benzonatate (TESSALON) 200 mg capsule, Take 1 capsule (200 mg total) by mouth 3 (three) times a day as needed for cough for up to 10 days., Disp: 30 capsule, Rfl: 1  •  predniSONE (DELTASONE) 20 mg tablet, Take 2 tablets (40 mg total) by mouth daily for 5 days., Disp: 10 tablet, Rfl: 0  •  albuterol HFA 90 mcg/actuation inhaler, Inhale 2 puffs every 6 (six) hours as needed for wheezing., Disp: 18 g, Rfl: 1  •  atorvastatin (LIPITOR) 20 mg tablet, Take 1 tablet (20 mg total) by mouth daily., Disp: 90 tablet, Rfl: 1  •  ezetimibe (ZETIA) 10 mg tablet, Take 1 tablet (10 mg total) by mouth daily., Disp: 90 tablet, Rfl: 1  •  glipiZIDE (GLUCOTROL) 10 mg tablet, Take 1 tablet (10 mg total) by mouth daily., Disp: 90 tablet, Rfl: 1  •  losartan (COZAAR) 100 mg tablet, Take 1 tablet (100 mg total) by mouth daily., Disp: 90 tablet, Rfl: 1  •  metFORMIN XR (GLUCOPHAGE-XR) 500 mg 24 hr tablet, Take 2 tablets (1,000 mg total) by mouth daily. (Patient taking differently: Take 500 mg by mouth daily.), Disp: 180 tablet, Rfl: 1  •  SITagliptin (JANUVIA) 100 mg tablet, Take 100 mg by mouth daily., Disp: , Rfl:     No Known Allergies      Health Maintenance   Topic Date Due   • Colorectal Cancer Screening  Never done   • COVID-19  "Vaccine (1) Never done   • Diabetic Foot Exam  Never done   • Medicare Annual Wellness Visit  Never done   • Zoster Vaccine (1 of 2) Never done   • Falls Risk Screening  Never done   • Pneumococcal (65 years and older) (3 - PPSV23 if available, else PCV20) 09/20/2018   • Influenza Vaccine (1) 08/01/2023   • Depression Screening  12/27/2023   • Diabetes Kidney Health Evaluation:  uACR  01/13/2024   • Diabetes Kidney Health Evaluation: eGFR  06/05/2024   • Hemoglobin A1C  06/05/2024   • Annual Dilated Retinal Exam  07/11/2024   • DTaP, Tdap, and Td Vaccines (2 - Td or Tdap) 07/21/2032   • Hepatitis C Screening  Completed   • Meningococcal ACWY  Aged Out   • HIB Vaccines  Aged Out   • Hepatitis B Vaccines  Aged Out   • IPV Vaccines  Aged Out   • HPV Vaccines  Aged Out       Other screenings not listed above:    Review of Systems  Rest of ROS as above    Objective:    Vitals:    07/25/23 1443   BP: (!) 142/80   BP Location: Right upper arm   Patient Position: Sitting   Pulse: (!) 103   Temp: 36.2 °C (97.1 °F)   SpO2: 95%   Weight: 104 kg (229 lb)   Height: 1.778 m (5' 10\")       Body mass index is 32.86 kg/m².  Wt Readings from Last 3 Encounters:   07/25/23 104 kg (229 lb)   06/01/23 105 kg (231 lb)   01/31/23 105 kg (231 lb)          Physical Exam  Vitals reviewed.   Constitutional:       General: He is not in acute distress.     Appearance: He is not ill-appearing.   HENT:      Head: Normocephalic and atraumatic.   Eyes:      Conjunctiva/sclera: Conjunctivae normal.   Cardiovascular:      Rate and Rhythm: Normal rate and regular rhythm.   Pulmonary:      Effort: Pulmonary effort is normal.      Breath sounds: Wheezing present. No rhonchi or rales.   Neurological:      General: No focal deficit present.      Mental Status: He is alert.   Psychiatric:         Mood and Affect: Mood normal.         Behavior: Behavior normal.           Assessment and plan    Diagnoses and all orders for this visit:    Mild intermittent " asthma with acute exacerbation (Primary)  No formal asthma dx, though has used albuterol inhaler with some benefit  Will treat as exacerbation with pred burst and albuterol PRN  Reviewed ER precautions  Advised pt to schedule PFTs after he is well  -     Pulmonary function tests Spirometry before and after bronchodilator; Future  -     predniSONE (DELTASONE) 20 mg tablet; Take 2 tablets (40 mg total) by mouth daily for 5 days.  -     benzonatate (TESSALON) 200 mg capsule; Take 1 capsule (200 mg total) by mouth 3 (three) times a day as needed for cough for up to 10 days.    Wheezing  -     Pulmonary function tests Spirometry before and after bronchodilator; Future    Primary hypertension  BP elevated likely in s/o acute respiratory illness  Continue current medications and continue to monitor    Type 2 diabetes mellitus without complication, without long-term current use of insulin (CMS/[facility])  Recheck A1c prior to next visit  -     Hemoglobin A1c; Future        Return in 2 months (on 10/9/2023) for Next scheduled follow-up.       7/25/2023

## 2023-08-22 ENCOUNTER — DOCUMENTATION (OUTPATIENT)
Dept: INTERNAL MEDICINE | Facility: CLINIC | Age: 73
End: 2023-08-22
Payer: COMMERCIAL

## 2023-08-22 NOTE — PROGRESS NOTES
Marely Blank MA has completed a chart review for Randell Samuel    Care Gap Source: [employer]    Care Gap(s) Identified: Diabetic Eye Exam    Chart Review Completed: Yes    Eye exam - pos DR- 07/2023  TO BE REPEATED 07/2024

## 2023-08-31 ENCOUNTER — HOSPITAL ENCOUNTER (OUTPATIENT)
Dept: PULMONOLOGY | Facility: HOSPITAL | Age: 73
End: 2023-08-31
Payer: COMMERCIAL

## 2023-08-31 DIAGNOSIS — R05.3 PERSISTENT COUGH: ICD-10-CM

## 2023-08-31 PROCEDURE — 94060 EVALUATION OF WHEEZING: CPT

## 2023-08-31 PROCEDURE — 94726 PLETHYSMOGRAPHY LUNG VOLUMES: CPT | Performed by: INTERNAL MEDICINE

## 2023-08-31 PROCEDURE — 94760 N-INVAS EAR/PLS OXIMETRY 1: CPT

## 2023-08-31 PROCEDURE — 94729 DIFFUSING CAPACITY: CPT | Performed by: INTERNAL MEDICINE

## 2023-08-31 PROCEDURE — 94729 DIFFUSING CAPACITY: CPT

## 2023-08-31 PROCEDURE — 94060 EVALUATION OF WHEEZING: CPT | Performed by: INTERNAL MEDICINE

## 2023-08-31 PROCEDURE — 94726 PLETHYSMOGRAPHY LUNG VOLUMES: CPT

## 2023-08-31 RX ORDER — ALBUTEROL SULFATE 2.5 MG/3ML
2.5 SOLUTION RESPIRATORY (INHALATION) ONCE
Status: COMPLETED | OUTPATIENT
Start: 2023-08-31 | End: 2023-08-31

## 2023-08-31 RX ADMIN — ALBUTEROL SULFATE 2.5 MG: 2.5 SOLUTION RESPIRATORY (INHALATION) at 12:25

## 2023-08-31 NOTE — RESPIRATORY THERAPY NOTE
Patient Completed Pre and Post Spirometry, Pleth, and DLCO. Good pt effort. DLCO adjusted fot HB. DLCO Inspiratory Volume <90% of VC. DLCO SVC greater than IVC . Pt given 2.5 mg Albuterol via nebulizer. The Test Results meet ATS standards for acceptability and repeatability. Resting Sat 95 % on RA.   BPM

## 2023-10-02 ENCOUNTER — TELEPHONE (OUTPATIENT)
Dept: INTERNAL MEDICINE | Facility: CLINIC | Age: 73
End: 2023-10-02
Payer: COMMERCIAL

## 2023-10-02 NOTE — TELEPHONE ENCOUNTER
Pt called to cancel his, and his wife's, Dyan Jimmie with Dr. Kumar.  He wants Dr. Kumar to know that they both think she is great but because of new insurance they had to switch to a new provider.

## 2023-11-30 DIAGNOSIS — E78.9 LIPID DISORDER: ICD-10-CM

## 2023-11-30 RX ORDER — ATORVASTATIN CALCIUM 20 MG/1
20 TABLET, FILM COATED ORAL DAILY
Qty: 90 TABLET | Refills: 3 | Status: SHIPPED | OUTPATIENT
Start: 2023-11-30

## 2024-01-02 RX ORDER — EZETIMIBE 10 MG/1
10 TABLET ORAL DAILY
Qty: 90 TABLET | Refills: 3 | Status: SHIPPED | OUTPATIENT
Start: 2024-01-02